# Patient Record
Sex: FEMALE | Race: WHITE | NOT HISPANIC OR LATINO | Employment: UNEMPLOYED | URBAN - METROPOLITAN AREA
[De-identification: names, ages, dates, MRNs, and addresses within clinical notes are randomized per-mention and may not be internally consistent; named-entity substitution may affect disease eponyms.]

---

## 2018-07-12 ENCOUNTER — OFFICE VISIT (OUTPATIENT)
Dept: NEUROSURGERY | Facility: CLINIC | Age: 63
End: 2018-07-12

## 2018-07-12 VITALS
BODY MASS INDEX: 24.59 KG/M2 | WEIGHT: 138.8 LBS | DIASTOLIC BLOOD PRESSURE: 86 MMHG | HEIGHT: 63 IN | TEMPERATURE: 98.9 F | SYSTOLIC BLOOD PRESSURE: 120 MMHG

## 2018-07-12 DIAGNOSIS — G44.89 OTHER HEADACHE SYNDROME: ICD-10-CM

## 2018-07-12 DIAGNOSIS — G35 MS (MULTIPLE SCLEROSIS) (HCC): ICD-10-CM

## 2018-07-12 DIAGNOSIS — M21.372 FOOT DROP, LEFT: ICD-10-CM

## 2018-07-12 DIAGNOSIS — M54.50 CHRONIC BILATERAL LOW BACK PAIN WITHOUT SCIATICA: ICD-10-CM

## 2018-07-12 DIAGNOSIS — M47.12 CERVICAL SPONDYLOSIS WITH MYELOPATHY: Primary | ICD-10-CM

## 2018-07-12 DIAGNOSIS — M79.605 PAIN OF LEFT LOWER EXTREMITY: ICD-10-CM

## 2018-07-12 DIAGNOSIS — G89.29 CHRONIC BILATERAL LOW BACK PAIN WITHOUT SCIATICA: ICD-10-CM

## 2018-07-12 PROBLEM — M79.606 LEG PAIN: Status: ACTIVE | Noted: 2018-07-12

## 2018-07-12 PROCEDURE — 99214 OFFICE O/P EST MOD 30 MIN: CPT | Performed by: PHYSICIAN ASSISTANT

## 2018-07-12 RX ORDER — OXYCODONE AND ACETAMINOPHEN 10; 325 MG/1; MG/1
TABLET ORAL
Refills: 0 | COMMUNITY
Start: 2018-06-27

## 2018-07-12 RX ORDER — ASPIRIN 81 MG/1
81 TABLET ORAL DAILY
COMMUNITY

## 2018-07-12 RX ORDER — PAROXETINE HYDROCHLORIDE 40 MG/1
TABLET, FILM COATED ORAL
Refills: 5 | COMMUNITY
Start: 2018-06-21

## 2018-07-12 RX ORDER — LEVOTHYROXINE SODIUM 0.03 MG/1
TABLET ORAL
Refills: 0 | COMMUNITY
Start: 2018-06-28

## 2018-07-12 RX ORDER — BACLOFEN 10 MG/1
10 TABLET ORAL 3 TIMES DAILY
COMMUNITY

## 2018-07-12 NOTE — PROGRESS NOTES
" Ms. Anisa Solis 63 y.o. female referred by Shlomo Vargas MD  [address]  [place] [address]      Chief Complaint: \"back pain\"    HPI: in consultation for chronic middle back and lower back pain. She reports a history of neck and back Pain for years due to degenerative osteoarthritis, which began without incident. The pain has progressed in intensity over the past  Months.  Patient complains of constant pain with intermittent exacerbation, described as aching, burning, dull and numbing sensation. The pain is in the back and in the left foot.  Pain increases with trying phyical therapy, bike therapy; walking... Pain decreases with analgesics. Patient reports  pain and weakness in the lower extremity patient denies  any new bladder or bowel problems.  The patient has a chronic left foot drop which is been present for years.  The patient states that she is able to ambulate saw him in the morning but then as the afternoon and the day goes on she is too weak.  She attended physical therapy in the spring and reports she rode a bike and had no significant benefit from her visits.  She has not seen a therapist specifically for conditioning due to her MS.  Patient was diagnosed with MS in  2009 and she is currently being seen Dr. Bill Vargas of neurology here in [place].  The patient saw Dr. Hester in 2002 with neck pain and low back pain and diagnosed with cervical spondylosis.  The patient did have an abnormal MRI scan of the brain at that time.  The patient was treated with Celebrex for her degenerative osteoarthritis Dr. Hester in 2002.  The patient is on chronic pain therapy, percocet 10/325.    Past Medical History:   Diagnosis Date   • Asthma    • COPD (chronic obstructive pulmonary disease) (CMS/[facility])    • Headache    • Hearing loss    • Hernia, inguinal, left      *MS    Allergies   Allergen Reactions   • Penicillins Anaphylaxis     Facial and neck swelling/tightness   • Sulfa " Antibiotics Hives         Current Outpatient Prescriptions:   •  aspirin 81 MG EC tablet, Take 81 mg by mouth Daily., Disp: , Rfl:   •  baclofen (LIORESAL) 10 MG tablet, Take 10 mg by mouth 3 (Three) Times a Day., Disp: , Rfl:   •  levothyroxine (SYNTHROID, LEVOTHROID) 25 MCG tablet, , Disp: , Rfl: 0  •  oxyCODONE-acetaminophen (PERCOCET)  MG per tablet, take 1 to 2 tablets by mouth every 8 hours if needed for pain, Disp: , Rfl: 0  •  PARoxetine (PAXIL) 40 MG tablet, , Disp: , Rfl: 5    Social History     Social History   • Marital status:      Social History Main Topics   • Smoking status: Former Smoker     Quit date: 2018   • Smokeless tobacco: Never Used   • Alcohol use Yes   • Drug use: No   • Sexual activity: Defer     Other Topics Concern   • Not on file     The patient has a family history of cancer and her father who  at age of 57, her 4 sisters are  with cancer and her mother is alive.    Review of Systems   Constitutional: Positive for activity change, appetite change, fatigue and unexpected weight change. Negative for chills, diaphoresis and fever.   HENT: Positive for ear discharge, ear pain, facial swelling and voice change. Negative for congestion, dental problem, drooling, hearing loss, mouth sores, nosebleeds, postnasal drip, rhinorrhea, sinus pressure, sneezing, sore throat, tinnitus and trouble swallowing.    Eyes: Positive for photophobia, pain, itching and visual disturbance. Negative for discharge and redness.   Respiratory: Negative for apnea, cough, choking, chest tightness, shortness of breath, wheezing and stridor.    Cardiovascular: Negative for chest pain, palpitations and leg swelling.   Gastrointestinal: Positive for constipation. Negative for abdominal distention, abdominal pain, anal bleeding, blood in stool, diarrhea, nausea, rectal pain and vomiting.   Endocrine: Positive for cold intolerance and polyuria. Negative for heat intolerance, polydipsia and  "polyphagia.   Genitourinary: Negative for decreased urine volume, difficulty urinating, dysuria, enuresis, flank pain, frequency, genital sores, hematuria and urgency.   Musculoskeletal: Positive for arthralgias, back pain, gait problem, joint swelling, myalgias, neck pain and neck stiffness.   Skin: Negative for color change, pallor, rash and wound.   Allergic/Immunologic: Negative for environmental allergies, food allergies and immunocompromised state.   Neurological: Positive for weakness, numbness and headaches. Negative for dizziness, tremors, seizures, syncope, facial asymmetry, speech difficulty and light-headedness.   Hematological: Negative for adenopathy. Bruises/bleeds easily.   Psychiatric/Behavioral: Negative for agitation, behavioral problems, confusion, decreased concentration, dysphoric mood, hallucinations, self-injury, sleep disturbance and suicidal ideas. The patient is not nervous/anxious and is not hyperactive.    All other systems reviewed and are negative.    /86 (BP Location: Left arm, Patient Position: Sitting, Cuff Size: Adult)   Temp 98.9 °F (37.2 °C) (Temporal Artery )   Ht 160 cm (63\")   Wt 63 kg (138 lb 12.8 oz)   BMI 24.59 kg/m²   Patient presents in a wheelchair. Her spouse is present and helpful.    Physical Exam   Constitutional: She is oriented to person, place, and time. She appears well-developed.   HENT:   Head: Normocephalic and atraumatic.   Eyes: Conjunctivae are normal.   Neck: Neck supple.   Pulmonary/Chest: Effort normal.   Abdominal: Soft.   Neurological: She is alert and oriented to person, place, and time.   Skin: Skin is warm and dry.   Psychiatric: She has a normal mood and affect. Her speech is normal and behavior is normal. Judgment and thought content normal.       Neurologic Exam     Mental Status   Oriented to person, place, and time.   Attention: normal.   Speech: speech is normal   Level of consciousness: alert  Knowledge: consistent with education. "     Motor Exam   Muscle bulk: decreased  Overall muscle tone: decreased  hyperreflexia is noted in the UE's and Patellar reflexes.  Achilles reflex is absent. She has severe weakness in the left foot and unable to dorsiflex or extend. Some weakness in the left leg as compared to the right.    Review of diagnostic Studies:  MRI scan of the lumbar spine shows multilevel degenerative disc disease.  There is a disc bulge at L2-3, mild degenerative changes at L3-4 and at L4-5 there is mild broad-based disc bulging, facet hypertrophy worse on the left than the right, foraminal stenosis on the left and mild-to-moderate spinal stenosis.  There is mild changes at L5-S1.    MDM: we have had a long discussion amongst the 3 of us regarding her level of activities associated with her MS and overall deconditioning.  She would truly benefit from physical therapy with someone who is focused on patient's with MS symptoms and they are going to pursue this type of therapy.  We should evaluate the patient's cervical spine with the findings of hyperreflexia on exam and prior known cervical spondylosis.  We would like to proceed with an EMG and nerve conduction study and explore whether her neurologist can perform this study.  Whether or not the patient is a candidate for surgical intervention will be determined after reviewing her studies.  The patient and spouse are in agreement with the above plan we will see them back when her studies are completed.    Helena Metcalf PA-C          Patient Care Team:  Tyrese Short MD as PCP - General (Family Medicine)  Shlomo Vargas MD as Referring Physician (Psychiatry)

## 2018-07-30 ENCOUNTER — OFFICE VISIT (OUTPATIENT)
Dept: NEUROSURGERY | Facility: CLINIC | Age: 63
End: 2018-07-30

## 2018-07-30 VITALS
WEIGHT: 143 LBS | DIASTOLIC BLOOD PRESSURE: 70 MMHG | TEMPERATURE: 97.7 F | BODY MASS INDEX: 25.34 KG/M2 | SYSTOLIC BLOOD PRESSURE: 128 MMHG | HEIGHT: 63 IN

## 2018-07-30 DIAGNOSIS — M79.605 PAIN OF LEFT LOWER EXTREMITY: ICD-10-CM

## 2018-07-30 DIAGNOSIS — M47.12 CERVICAL SPONDYLOSIS WITH MYELOPATHY: ICD-10-CM

## 2018-07-30 DIAGNOSIS — G35 MS (MULTIPLE SCLEROSIS) (HCC): Primary | ICD-10-CM

## 2018-07-30 DIAGNOSIS — M21.372 FOOT DROP, LEFT: ICD-10-CM

## 2018-07-30 DIAGNOSIS — M54.50 CHRONIC BILATERAL LOW BACK PAIN WITHOUT SCIATICA: ICD-10-CM

## 2018-07-30 DIAGNOSIS — G89.29 CHRONIC BILATERAL LOW BACK PAIN WITHOUT SCIATICA: ICD-10-CM

## 2018-07-30 PROCEDURE — 99214 OFFICE O/P EST MOD 30 MIN: CPT | Performed by: PHYSICIAN ASSISTANT

## 2021-03-02 ENCOUNTER — HOSPITAL ENCOUNTER (OUTPATIENT)
Dept: MRI IMAGING | Age: 66
Discharge: HOME | End: 2021-03-02
Attending: PSYCHIATRY & NEUROLOGY
Payer: MEDICARE

## 2021-03-02 DIAGNOSIS — F29 PSYCHOSIS, UNSPECIFIED PSYCHOSIS TYPE (HCC): ICD-10-CM

## 2021-03-02 DIAGNOSIS — G35 MS (MULTIPLE SCLEROSIS) (HCC): ICD-10-CM

## 2021-03-02 PROCEDURE — A9575 INJ GADOTERATE MEGLUMI 0.1ML: HCPCS | Performed by: PSYCHIATRY & NEUROLOGY

## 2021-03-02 PROCEDURE — 70553 MRI BRAIN STEM W/O & W/DYE: CPT

## 2021-03-02 PROCEDURE — 74011250636 HC RX REV CODE- 250/636: Performed by: PSYCHIATRY & NEUROLOGY

## 2021-03-02 RX ORDER — GADOTERATE MEGLUMINE 376.9 MG/ML
12 INJECTION INTRAVENOUS
Status: COMPLETED | OUTPATIENT
Start: 2021-03-02 | End: 2021-03-02

## 2021-03-02 RX ORDER — SODIUM CHLORIDE 0.9 % (FLUSH) 0.9 %
10 SYRINGE (ML) INJECTION
Status: COMPLETED | OUTPATIENT
Start: 2021-03-02 | End: 2021-03-02

## 2021-03-02 RX ADMIN — Medication 10 ML: at 07:45

## 2021-03-02 RX ADMIN — GADOTERATE MEGLUMINE 12 ML: 376.9 INJECTION INTRAVENOUS at 07:45

## 2021-06-11 ENCOUNTER — DOCUMENTATION (OUTPATIENT)
Dept: NEUROSURGERY | Facility: CLINIC | Age: 66
End: 2021-06-11

## 2021-06-11 NOTE — PROGRESS NOTES
Anisa Solis  1955  [id number]    Anisa Solis was seen in 2018 with symptoms of MS, cervical osteoarthritis, cervical spondylosis and left foot drop.  The patient's symptoms were worsening and it has been recommended that the patient have a custom AFO for ankle stabilization due to weakness and deformity of the ankle and foot.  The brace will provide functionality of alignment for the patient to be able to ambulate with decreased risk of tripping or falling.  A custom AFO is required for lifetime need.    Helena Metcalf PA-C

## 2021-07-19 ENCOUNTER — HOSPITAL ENCOUNTER (OUTPATIENT)
Dept: MRI IMAGING | Age: 66
Discharge: HOME | End: 2021-07-19
Attending: PSYCHIATRY & NEUROLOGY
Payer: MEDICARE

## 2021-07-19 DIAGNOSIS — G35 MS (MULTIPLE SCLEROSIS) (HCC): ICD-10-CM

## 2021-07-19 DIAGNOSIS — M51.36 DDD (DEGENERATIVE DISC DISEASE), LUMBAR: ICD-10-CM

## 2021-07-19 DIAGNOSIS — G57.32 NEUROPATHY OF LEFT PERONEAL NERVE: ICD-10-CM

## 2021-07-19 PROCEDURE — 72156 MRI NECK SPINE W/O & W/DYE: CPT

## 2021-07-19 PROCEDURE — 72157 MRI CHEST SPINE W/O & W/DYE: CPT

## 2021-07-19 PROCEDURE — A9576 INJ PROHANCE MULTIPACK: HCPCS | Performed by: PSYCHIATRY & NEUROLOGY

## 2021-07-19 PROCEDURE — 74011636320 HC RX REV CODE- 636/320: Performed by: PSYCHIATRY & NEUROLOGY

## 2021-07-19 PROCEDURE — 72148 MRI LUMBAR SPINE W/O DYE: CPT

## 2021-07-19 RX ORDER — SODIUM CHLORIDE 0.9 % (FLUSH) 0.9 %
10 SYRINGE (ML) INJECTION
Status: COMPLETED | OUTPATIENT
Start: 2021-07-19 | End: 2021-07-19

## 2021-07-19 RX ADMIN — GADOTERIDOL 12 ML: 279.3 INJECTION, SOLUTION INTRAVENOUS at 12:13

## 2021-07-19 RX ADMIN — Medication 10 ML: at 12:14
